# Patient Record
Sex: FEMALE | Race: WHITE | NOT HISPANIC OR LATINO | Employment: FULL TIME | URBAN - METROPOLITAN AREA
[De-identification: names, ages, dates, MRNs, and addresses within clinical notes are randomized per-mention and may not be internally consistent; named-entity substitution may affect disease eponyms.]

---

## 2018-04-25 ENCOUNTER — OFFICE VISIT - HEALTHEAST (OUTPATIENT)
Dept: FAMILY MEDICINE | Facility: CLINIC | Age: 58
End: 2018-04-25

## 2018-04-25 ENCOUNTER — COMMUNICATION - HEALTHEAST (OUTPATIENT)
Dept: SCHEDULING | Facility: CLINIC | Age: 58
End: 2018-04-25

## 2018-04-25 DIAGNOSIS — M54.9 BACK PAIN: ICD-10-CM

## 2018-04-25 DIAGNOSIS — Z12.31 VISIT FOR SCREENING MAMMOGRAM: ICD-10-CM

## 2018-04-25 DIAGNOSIS — Z12.11 SCREEN FOR COLON CANCER: ICD-10-CM

## 2018-04-25 LAB
ALBUMIN UR-MCNC: NEGATIVE MG/DL
APPEARANCE UR: CLEAR
BILIRUB UR QL STRIP: NEGATIVE
COLOR UR AUTO: YELLOW
GLUCOSE UR STRIP-MCNC: NEGATIVE MG/DL
HGB UR QL STRIP: NEGATIVE
KETONES UR STRIP-MCNC: NEGATIVE MG/DL
LEUKOCYTE ESTERASE UR QL STRIP: NEGATIVE
NITRATE UR QL: NEGATIVE
PH UR STRIP: 5.5 [PH] (ref 5–8)
SP GR UR STRIP: 1.02 (ref 1–1.03)
UROBILINOGEN UR STRIP-ACNC: NORMAL

## 2018-04-25 ASSESSMENT — MIFFLIN-ST. JEOR: SCORE: 1159.73

## 2018-04-30 ENCOUNTER — COMMUNICATION - HEALTHEAST (OUTPATIENT)
Dept: FAMILY MEDICINE | Facility: CLINIC | Age: 58
End: 2018-04-30

## 2018-05-01 ENCOUNTER — COMMUNICATION - HEALTHEAST (OUTPATIENT)
Dept: FAMILY MEDICINE | Facility: CLINIC | Age: 58
End: 2018-05-01

## 2018-05-01 ENCOUNTER — COMMUNICATION - HEALTHEAST (OUTPATIENT)
Dept: SCHEDULING | Facility: CLINIC | Age: 58
End: 2018-05-01

## 2018-08-02 ENCOUNTER — COMMUNICATION - HEALTHEAST (OUTPATIENT)
Dept: FAMILY MEDICINE | Facility: CLINIC | Age: 58
End: 2018-08-02

## 2019-04-29 ENCOUNTER — COMMUNICATION - HEALTHEAST (OUTPATIENT)
Dept: FAMILY MEDICINE | Facility: CLINIC | Age: 59
End: 2019-04-29

## 2019-04-29 DIAGNOSIS — Z12.31 VISIT FOR SCREENING MAMMOGRAM: ICD-10-CM

## 2020-10-16 ENCOUNTER — OFFICE VISIT - HEALTHEAST (OUTPATIENT)
Dept: FAMILY MEDICINE | Facility: CLINIC | Age: 60
End: 2020-10-16

## 2020-10-16 DIAGNOSIS — F43.10 PTSD (POST-TRAUMATIC STRESS DISORDER): ICD-10-CM

## 2020-10-16 RX ORDER — FAMOTIDINE 20 MG/1
20 TABLET, FILM COATED ORAL 2 TIMES DAILY
Status: SHIPPED | COMMUNITY
Start: 2020-10-16

## 2020-11-06 ENCOUNTER — COMMUNICATION - HEALTHEAST (OUTPATIENT)
Dept: FAMILY MEDICINE | Facility: CLINIC | Age: 60
End: 2020-11-06

## 2020-11-27 ENCOUNTER — OFFICE VISIT - HEALTHEAST (OUTPATIENT)
Dept: FAMILY MEDICINE | Facility: CLINIC | Age: 60
End: 2020-11-27

## 2020-11-27 DIAGNOSIS — F32.4 MAJOR DEPRESSIVE DISORDER WITH SINGLE EPISODE, IN PARTIAL REMISSION (H): ICD-10-CM

## 2020-11-27 ASSESSMENT — PATIENT HEALTH QUESTIONNAIRE - PHQ9: SUM OF ALL RESPONSES TO PHQ QUESTIONS 1-9: 0

## 2021-01-06 ENCOUNTER — COMMUNICATION - HEALTHEAST (OUTPATIENT)
Dept: FAMILY MEDICINE | Facility: CLINIC | Age: 61
End: 2021-01-06

## 2021-01-06 DIAGNOSIS — F43.10 PTSD (POST-TRAUMATIC STRESS DISORDER): ICD-10-CM

## 2021-05-27 ASSESSMENT — PATIENT HEALTH QUESTIONNAIRE - PHQ9: SUM OF ALL RESPONSES TO PHQ QUESTIONS 1-9: 0

## 2021-06-01 VITALS — HEIGHT: 63 IN | WEIGHT: 140.13 LBS | BODY MASS INDEX: 24.83 KG/M2
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
## 2021-06-12 NOTE — TELEPHONE ENCOUNTER
Please have her stay on the 1/2 tablet daily. We can discuss further later this month    Thank you    BB
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
## 2021-06-14 NOTE — TELEPHONE ENCOUNTER
RN cannot approve Refill Request    RN can NOT refill this medication please review sig. Last office visit: 4/25/2018 Anyi Izaguirre MD Last Physical: Visit date not found Last MTM visit: Visit date not found Last visit same specialty: 4/25/2018 Anyi Izaguirre MD.  Next visit within 3 mo: Visit date not found  Next physical within 3 mo: Visit date not found      Yolanda Olivo, [facility] Connection Triage/Med Refill 1/6/2021    Requested Prescriptions   Pending Prescriptions Disp Refills     sertraline (ZOLOFT) 50 MG tablet [Pharmacy Med Name: SERTRALINE HCL 50 MG TABLET] 85 tablet 0     Sig: TAKE 0.5 TABLETS (25 MG TOTAL) BY MOUTH DAILY FOR 10 DAYS, THEN 1 TABLET (50 MG TOTAL) DAILY.       [facility] Refill Protocol  Passed - 1/6/2021 12:34 AM        Passed - PCP or prescribing provider visit in last year     Last office visit with prescriber/PCP: 4/25/2018 Anyi Izaguirre MD OR same dept: Visit date not found OR same specialty: 4/25/2018 Anyi Izaguirre MD  Last physical: Visit date not found Last MTM visit: Visit date not found   Next visit within 3 mo: Visit date not found  Next physical within 3 mo: Visit date not found  Prescriber OR PCP: Anyi Izaguirre MD  Last diagnosis associated with med order: 1. PTSD (post-traumatic stress disorder)  - sertraline (ZOLOFT) 50 MG tablet [Pharmacy Med Name: SERTRALINE HCL 50 MG TABLET]; Take 0.5 tablets (25 mg total) by mouth daily for 10 days, THEN 1 tablet (50 mg total) daily.  Dispense: 85 tablet; Refill: 0    If protocol passes may refill for 12 months if within 3 months of last provider visit (or a total of 15 months).

## 2021-06-19 NOTE — LETTER
Letter by Anyi Izaguirre MD at      Author: Anyi Izaguirre MD Service: -- Author Type: --    Filed:  Encounter Date: 4/29/2019 Status: (Other)         Fidelina Fry  [address]  [place] [address]             April 29, 2019        Dear Fidelina Fry :    Dr. Izaguirre was reviewing your chart and noticed that you are due for a mammogram. Your last mammogram was 2/15/2014. A referral for a mammogram was placed in your chart on 4/29/19.    To prevent delays in your care, please call [phone] to schedule your screening mammogram.    If you had a mammogram performed within the last 2 years at a different facility please contact the [facility] at [phone]   so we can get that report.      Sincerely,  Your care team at [facility]

## 2021-06-21 NOTE — LETTER
Letter by Anyi Izaguirre MD at      Author: Anyi Izaguirre MD Service: -- Author Type: --    Filed:  Encounter Date: 10/16/2020 Status: (Other)         October 16, 2020     Patient: Fidelina Fry   YOB: 1960   Date of Visit: 10/16/2020       To Whom It May Concern:    It is my medical opinion that it is medically necessary for Fidelina Fry to have her service dog living with her.    Service dog ID:  [id number]  Service dog name: Christi    If you have any questions or concerns, please don't hesitate to call.    Sincerely,        Electronically signed by Anyi Izaguirre MD

## 2022-01-25 ENCOUNTER — LAB (OUTPATIENT)
Dept: FAMILY MEDICINE | Facility: CLINIC | Age: 62
End: 2022-01-25
Payer: COMMERCIAL

## 2022-01-25 DIAGNOSIS — Z20.822 SUSPECTED COVID-19 VIRUS INFECTION: ICD-10-CM

## 2022-01-25 PROCEDURE — U0005 INFEC AGEN DETEC AMPLI PROBE: HCPCS

## 2022-01-25 PROCEDURE — U0003 INFECTIOUS AGENT DETECTION BY NUCLEIC ACID (DNA OR RNA); SEVERE ACUTE RESPIRATORY SYNDROME CORONAVIRUS 2 (SARS-COV-2) (CORONAVIRUS DISEASE [COVID-19]), AMPLIFIED PROBE TECHNIQUE, MAKING USE OF HIGH THROUGHPUT TECHNOLOGIES AS DESCRIBED BY CMS-2020-01-R: HCPCS

## 2022-01-26 ENCOUNTER — TELEPHONE (OUTPATIENT)
Dept: FAMILY MEDICINE | Facility: CLINIC | Age: 62
End: 2022-01-26
Payer: COMMERCIAL

## 2022-01-26 LAB — SARS-COV-2 RNA RESP QL NAA+PROBE: POSITIVE

## 2022-02-01 ENCOUNTER — TELEPHONE (OUTPATIENT)
Dept: FAMILY MEDICINE | Facility: CLINIC | Age: 62
End: 2022-02-01
Payer: COMMERCIAL

## 2022-02-01 NOTE — TELEPHONE ENCOUNTER
Reason for Call:  Vitamins & Covid     Detailed comments: Pt was tested Positive 1/25/22  Wondering what Vitamins she can take for this. ( D3 & K2, Quercetin complex with extra C plus, Omega 3 with Tumeric,  Zinc, NAC Powder      Phone Number Patient can be reached at: Home number on file [phone] (home)    Best Time: Any Time      Can we leave a detailed message on this number? YES    Call taken on 2/1/2022 at 7:08 AM by Tiffany Tinoco

## 2022-02-01 NOTE — TELEPHONE ENCOUNTER
Nothing specific for vitamins - stay well hydrated.    She can go to the [place] dept of health website to investigate monoclonal antibodies as well     EB

## 2022-02-01 NOTE — TELEPHONE ENCOUNTER
Call placed to patient.  Relayed message per Dr Izaguirre.  Patient state she does not have a computer.  She is not interested in monoclonal antibody infusion.  Reviewed home care recommendations and red flag symptoms that would need evaluation.  Patient verbalized understanding.  Ashlee Bautista RN

## 2023-09-01 ENCOUNTER — OFFICE VISIT (OUTPATIENT)
Dept: FAMILY MEDICINE | Facility: CLINIC | Age: 63
End: 2023-09-01
Payer: COMMERCIAL

## 2023-09-01 VITALS
TEMPERATURE: 98.1 F | BODY MASS INDEX: 27.46 KG/M2 | OXYGEN SATURATION: 98 % | RESPIRATION RATE: 16 BRPM | WEIGHT: 149.2 LBS | HEIGHT: 62 IN | SYSTOLIC BLOOD PRESSURE: 118 MMHG | DIASTOLIC BLOOD PRESSURE: 78 MMHG | HEART RATE: 60 BPM

## 2023-09-01 DIAGNOSIS — F32.4 MAJOR DEPRESSIVE DISORDER WITH SINGLE EPISODE, IN PARTIAL REMISSION (H): ICD-10-CM

## 2023-09-01 DIAGNOSIS — Z12.11 SCREEN FOR COLON CANCER: ICD-10-CM

## 2023-09-01 DIAGNOSIS — Z12.31 VISIT FOR SCREENING MAMMOGRAM: ICD-10-CM

## 2023-09-01 DIAGNOSIS — Z00.00 HEALTHCARE MAINTENANCE: ICD-10-CM

## 2023-09-01 DIAGNOSIS — Z00.00 ENCOUNTER FOR MEDICARE ANNUAL WELLNESS EXAM: Primary | ICD-10-CM

## 2023-09-01 LAB
ALBUMIN SERPL BCG-MCNC: 4.4 G/DL (ref 3.5–5.2)
ALP SERPL-CCNC: 105 U/L (ref 35–104)
ALT SERPL W P-5'-P-CCNC: 17 U/L (ref 0–50)
ANION GAP SERPL CALCULATED.3IONS-SCNC: 9 MMOL/L (ref 7–15)
AST SERPL W P-5'-P-CCNC: 22 U/L (ref 0–45)
BILIRUB SERPL-MCNC: 0.3 MG/DL
BUN SERPL-MCNC: 16.4 MG/DL (ref 8–23)
CALCIUM SERPL-MCNC: 9.9 MG/DL (ref 8.8–10.2)
CHLORIDE SERPL-SCNC: 105 MMOL/L (ref 98–107)
CHOLEST SERPL-MCNC: 193 MG/DL
CREAT SERPL-MCNC: 0.93 MG/DL (ref 0.51–0.95)
DEPRECATED HCO3 PLAS-SCNC: 29 MMOL/L (ref 22–29)
ERYTHROCYTE [DISTWIDTH] IN BLOOD BY AUTOMATED COUNT: 12.9 % (ref 10–15)
GFR SERPL CREATININE-BSD FRML MDRD: 68 ML/MIN/1.73M2
GLUCOSE SERPL-MCNC: 99 MG/DL (ref 70–99)
HBA1C MFR BLD: 5.6 % (ref 0–5.6)
HCT VFR BLD AUTO: 40.3 % (ref 35–47)
HDLC SERPL-MCNC: 70 MG/DL
HGB BLD-MCNC: 13.3 G/DL (ref 11.7–15.7)
LDLC SERPL CALC-MCNC: 106 MG/DL
MCH RBC QN AUTO: 29.8 PG (ref 26.5–33)
MCHC RBC AUTO-ENTMCNC: 33 G/DL (ref 31.5–36.5)
MCV RBC AUTO: 90 FL (ref 78–100)
NONHDLC SERPL-MCNC: 123 MG/DL
PLATELET # BLD AUTO: 283 10E3/UL (ref 150–450)
POTASSIUM SERPL-SCNC: 4.8 MMOL/L (ref 3.4–5.3)
PROT SERPL-MCNC: 7.2 G/DL (ref 6.4–8.3)
RBC # BLD AUTO: 4.47 10E6/UL (ref 3.8–5.2)
SODIUM SERPL-SCNC: 143 MMOL/L (ref 136–145)
TRIGL SERPL-MCNC: 85 MG/DL
TSH SERPL DL<=0.005 MIU/L-ACNC: 1.86 UIU/ML (ref 0.3–4.2)
WBC # BLD AUTO: 6.7 10E3/UL (ref 4–11)

## 2023-09-01 PROCEDURE — 83036 HEMOGLOBIN GLYCOSYLATED A1C: CPT | Performed by: FAMILY MEDICINE

## 2023-09-01 PROCEDURE — 84443 ASSAY THYROID STIM HORMONE: CPT | Performed by: FAMILY MEDICINE

## 2023-09-01 PROCEDURE — 36415 COLL VENOUS BLD VENIPUNCTURE: CPT | Performed by: FAMILY MEDICINE

## 2023-09-01 PROCEDURE — 80053 COMPREHEN METABOLIC PANEL: CPT | Performed by: FAMILY MEDICINE

## 2023-09-01 PROCEDURE — G0402 INITIAL PREVENTIVE EXAM: HCPCS | Performed by: FAMILY MEDICINE

## 2023-09-01 PROCEDURE — 85027 COMPLETE CBC AUTOMATED: CPT | Performed by: FAMILY MEDICINE

## 2023-09-01 PROCEDURE — 80061 LIPID PANEL: CPT | Performed by: FAMILY MEDICINE

## 2023-09-01 ASSESSMENT — PATIENT HEALTH QUESTIONNAIRE - PHQ9
SUM OF ALL RESPONSES TO PHQ QUESTIONS 1-9: 0
SUM OF ALL RESPONSES TO PHQ QUESTIONS 1-9: 0
10. IF YOU CHECKED OFF ANY PROBLEMS, HOW DIFFICULT HAVE THESE PROBLEMS MADE IT FOR YOU TO DO YOUR WORK, TAKE CARE OF THINGS AT HOME, OR GET ALONG WITH OTHER PEOPLE: NOT DIFFICULT AT ALL

## 2023-09-01 ASSESSMENT — ENCOUNTER SYMPTOMS
WEAKNESS: 0
PALPITATIONS: 0
CONSTIPATION: 0
BREAST MASS: 0
DIARRHEA: 0
PARESTHESIAS: 0
NAUSEA: 0
DYSURIA: 0
DIZZINESS: 0
FEVER: 0
EYE PAIN: 0
NERVOUS/ANXIOUS: 0
SORE THROAT: 0
COUGH: 1
HEMATOCHEZIA: 0
CHILLS: 0
ABDOMINAL PAIN: 0
MYALGIAS: 0
JOINT SWELLING: 0
HEMATURIA: 0
HEARTBURN: 1
HEADACHES: 0
FREQUENCY: 0
ARTHRALGIAS: 0

## 2023-09-01 ASSESSMENT — ACTIVITIES OF DAILY LIVING (ADL): CURRENT_FUNCTION: NO ASSISTANCE NEEDED

## 2023-09-01 NOTE — LETTER
September 5, 2023      Fidelina Fry  [address]    [place] [address]        Dear ,    We are writing to inform you of your test results.    It was great to see you!  Kidneys, electrolytes, blood sugar, thyroid, blood counts and liver function are normal.  Screening test for diabetes is normal.      Based on your age, race, smoking status,blood-pressure, and cholesterol levels your calculated 10 year risk for a vascular event (heart attack/stroke) is 4.2%.   Generally we will recommend starting a cholesterol medication once that risk is about 7.5%.     The 10-year ASCVD risk score (Svetlana BRUCE, et al., 2019) is: 4.2%     Values used to calculate the score:       Age: 63 years       Sex: Female       Is Non- : No       Diabetic: No       Tobacco smoker: No       Systolic Blood Pressure: 118 mmHg       Is BP treated: No       HDL Cholesterol: 70 mg/dL       Total Cholesterol: 193 mg/dL        Please let me know if you have any questions!     Resulted Orders   Lipid panel reflex to direct LDL Non-fasting   Result Value Ref Range    Cholesterol 193 <200 mg/dL    Triglycerides 85 <150 mg/dL    Direct Measure HDL 70 >=50 mg/dL    LDL Cholesterol Calculated 106 (H) <=100 mg/dL    Non HDL Cholesterol 123 <130 mg/dL    Narrative    Cholesterol  Desirable:  <200 mg/dL    Triglycerides  Normal:  Less than 150 mg/dL  Borderline High:  150-199 mg/dL  High:  200-499 mg/dL  Very High:  Greater than or equal to 500 mg/dL    Direct Measure HDL  Female:  Greater than or equal to 50 mg/dL   Male:  Greater than or equal to 40 mg/dL    LDL Cholesterol  Desirable:  <100mg/dL  Above Desirable:  100-129 mg/dL   Borderline High:  130-159 mg/dL   High:  160-189 mg/dL   Very High:  >= 190 mg/dL    Non HDL Cholesterol  Desirable:  130 mg/dL  Above Desirable:  130-159 mg/dL  Borderline High:  160-189 mg/dL  High:  190-219 mg/dL  Very High:  Greater than or equal to 220 mg/dL   TSH with free  T4 reflex   Result Value Ref Range    TSH 1.86 0.30 - 4.20 uIU/mL   CBC with Platelets   Result Value Ref Range    WBC Count 6.7 4.0 - 11.0 10e3/uL    RBC Count 4.47 3.80 - 5.20 10e6/uL    Hemoglobin 13.3 11.7 - 15.7 g/dL    Hematocrit 40.3 35.0 - 47.0 %    MCV 90 78 - 100 fL    MCH 29.8 26.5 - 33.0 pg    MCHC 33.0 31.5 - 36.5 g/dL    RDW 12.9 10.0 - 15.0 %    Platelet Count 283 150 - 450 10e3/uL   COMPREHENSIVE METABOLIC PANEL   Result Value Ref Range    Sodium 143 136 - 145 mmol/L    Potassium 4.8 3.4 - 5.3 mmol/L    Chloride 105 98 - 107 mmol/L    Carbon Dioxide (CO2) 29 22 - 29 mmol/L    Anion Gap 9 7 - 15 mmol/L    Urea Nitrogen 16.4 8.0 - 23.0 mg/dL    Creatinine 0.93 0.51 - 0.95 mg/dL    Calcium 9.9 8.8 - 10.2 mg/dL    Glucose 99 70 - 99 mg/dL    Alkaline Phosphatase 105 (H) 35 - 104 U/L    AST 22 0 - 45 U/L      Comment:      Reference intervals for this test were updated on 6/12/2023 to more accurately reflect our healthy population. There may be differences in the flagging of prior results with similar values performed with this method. Interpretation of those prior results can be made in the context of the updated reference intervals.    ALT 17 0 - 50 U/L      Comment:      Reference intervals for this test were updated on 6/12/2023 to more accurately reflect our healthy population. There may be differences in the flagging of prior results with similar values performed with this method. Interpretation of those prior results can be made in the context of the updated reference intervals.      Protein Total 7.2 6.4 - 8.3 g/dL    Albumin 4.4 3.5 - 5.2 g/dL    Bilirubin Total 0.3 <=1.2 mg/dL    GFR Estimate 68 >60 mL/min/1.73m2   HEMOGLOBIN A1C   Result Value Ref Range    Hemoglobin A1C 5.6 0.0 - 5.6 %      Comment:      Normal <5.7%   Prediabetes 5.7-6.4%    Diabetes 6.5% or higher     Note: Adopted from ADA consensus guidelines.       If you have any questions or concerns, please call the clinic at the  number listed above.       Sincerely,      Anyi Izaguirre MD/  [facility]

## 2023-09-01 NOTE — PATIENT INSTRUCTIONS
Patient Education   Personalized Prevention Plan  You are due for the preventive services outlined below.  Your care team is available to assist you in scheduling these services.  If you have already completed any of these items, please share that information with your care team to update in your medical record.  Health Maintenance Due   Topic Date Due     Osteoporosis Screening  Never done     ANNUAL REVIEW OF HM ORDERS  Never done     Discuss Advance Care Planning  Never done     Depression Action Plan  Never done     COVID-19 Vaccine (1) Never done     Colorectal Cancer Screening  Never done     HIV Screening  Never done     Hepatitis C Screening  Never done     Zoster (Shingles) Vaccine (1 of 2) Never done     LUNG CANCER SCREENING  Never done     Mammogram  02/15/2016     Cholesterol Lab  02/08/2021     Depression Assessment  05/27/2021     Annual Wellness Visit  04/10/2023     FALL RISK ASSESSMENT  Never done     Flu Vaccine (1) Never done     Pneumococcal Vaccine (1 - PCV) 04/10/2023

## 2023-09-01 NOTE — PROGRESS NOTES
"SUBJECTIVE:   Fidelina is a 63 year old who presents for Preventive Visit.      9/1/2023     2:50 PM   Additional Questions   Roomed by LAUREN Olivia   Accompanied by self       Are you in the first 12 months of your Medicare coverage?  Yes,  Visual Acuity:  Right Eye: 20/20   Left Eye: 20/16  Both Eyes: 20/16    Healthy Habits:     In general, how would you rate your overall health?  Good    Frequency of exercise:  6-7 days/week    Duration of exercise:  Other    Do you usually eat at least 4 servings of fruit and vegetables a day, include whole grains    & fiber and avoid regularly eating high fat or \"junk\" foods?  No    Taking medications regularly:  Yes    Medication side effects:  Not applicable    Ability to successfully perform activities of daily living:  No assistance needed    Home Safety:  Throw rugs in the hallway and lack of grab bars in the bathroom    Hearing Impairment:  No hearing concerns    In the past 6 months, have you been bothered by leaking of urine? Yes    In general, how would you rate your overall mental or emotional health?  Good    Additional concerns today:  No    Has family hx of diabetes, would like to be screened for this.     Have you ever done Advance Care Planning? (For example, a Health Directive, POLST, or a discussion with a medical provider or your loved ones about your wishes): Yes, patient states has an Advance Care Planning document and will bring a copy to the clinic.       Fall risk  Fallen 2 or more times in the past year?: No  Any fall with injury in the past year?: No  click delete button to remove this line now  Cognitive Screening   1) Repeat 3 items (Leader, Season, Table)    2) Clock draw: NORMAL  3) 3 item recall: Recalls 1 object   Results: NORMAL clock, 1-2 items recalled: COGNITIVE IMPAIRMENT LESS LIKELY    Mini-CogTM Copyright S Matthieu. Licensed by the author for use in [facility]; reprinted with permission (stephani@.[facility]). All rights reserved.  "     Do you have sleep apnea, excessive snoring or daytime drowsiness? : no    Reviewed and updated as needed this visit by clinical staff                  Reviewed and updated as needed this visit by Provider                 Social History     Tobacco Use    Smoking status: Former     Types: Cigarettes     Quit date: 3/16/2018     Years since quittin.4    Smokeless tobacco: Never   Substance Use Topics    Alcohol use: No              No data to display                   No data to display              Do you have a current opioid prescription? No  Do you use any other controlled substances or medications that are not prescribed by a provider? None              Current providers sharing in care for this patient include:   Patient Care Team:  Anyi Izaguirre MD as PCP - General (Family Practice)  Anyi Izaguirre MD as Assigned PCP    The following health maintenance items are reviewed in Epic and correct as of today:  Health Maintenance   Topic Date Due    DEXA  Never done    ANNUAL REVIEW OF HM ORDERS  Never done    ADVANCE CARE PLANNING  Never done    DEPRESSION ACTION PLAN  Never done    COVID-19 Vaccine (1) Never done    COLORECTAL CANCER SCREENING  Never done    HIV SCREENING  Never done    HEPATITIS C SCREENING  Never done    ZOSTER IMMUNIZATION (1 of 2) Never done    LUNG CANCER SCREENING  Never done    MAMMO SCREENING  02/15/2016    LIPID  2021    PHQ-9  2021    MEDICARE ANNUAL WELLNESS VISIT  04/10/2023    FALL RISK ASSESSMENT  Never done    INFLUENZA VACCINE (1) Never done    Pneumococcal Vaccine: 65+ Years (1 - PCV) 04/10/2023    DTAP/TDAP/TD IMMUNIZATION (2 - Td or Tdap) 2024    IPV IMMUNIZATION  Aged Out    HPV IMMUNIZATION  Aged Out    MENINGITIS IMMUNIZATION  Aged Out    PAP  Discontinued     Lab work is in process  {    Mammogram Screening: Mammogram Screening: Recommended mammography every 1-2 years with patient discussion and risk factor consideration  Any new  "diagnosis of family breast, ovarian, or bowel cancer? No    FHS-7:        No data to display                Mammogram Screening: Recommended mammography every 1-2 years with patient discussion and risk factor consideration  Pertinent mammograms are reviewed under the imaging tab.    Review of Systems   Constitutional:  Negative for chills and fever.   HENT:  Negative for congestion, ear pain, hearing loss and sore throat.    Eyes:  Negative for pain and visual disturbance.   Respiratory:  Positive for cough.    Cardiovascular:  Positive for peripheral edema. Negative for chest pain and palpitations.   Gastrointestinal:  Positive for heartburn. Negative for abdominal pain, constipation, diarrhea, hematochezia and nausea.   Breasts:  Negative for tenderness, breast mass and discharge.   Genitourinary:  Negative for dysuria, frequency, genital sores, hematuria, pelvic pain, urgency, vaginal bleeding and vaginal discharge.   Musculoskeletal:  Negative for arthralgias, joint swelling and myalgias.   Skin:  Negative for rash.   Neurological:  Negative for dizziness, weakness, headaches and paresthesias.   Psychiatric/Behavioral:  Negative for mood changes. The patient is not nervous/anxious.          OBJECTIVE:   There were no vitals taken for this visit. Estimated body mass index is 24.82 kg/m  as calculated from the following:    Height as of 4/25/18: 1.6 m (5' 3\").    Weight as of 4/25/18: 63.6 kg (140 lb 2 oz).  Physical Exam    Physical Exam:  General Appearance: Alert, cooperative, no distress, appears stated age   Head: Normocephalic, without obvious abnormality, atraumatic  Eyes: PERRL, conjunctiva/corneas clear, EOM's intact   Ears: Normal TM's and external ear canals, both ears  Nose:Nares normal, septum midline,mucosa normal, no drainage    Throat:Lips, mucosa, and tongue normal; teeth and gums normal  Neck: Supple, symmetrical, trachea midline, no adenopathy;  thyroid: not enlarged, symmetric, no " tenderness/mass/nodules  Back: Symmetric, no curvature, ROM normal,  Lungs: Clear to auscultation bilaterally, respirations unlabored  Breasts: No breast masses, tenderness, asymmetry, or nipple discharge.  Heart: Regular rate and rhythm, S1 and S2 normal, no murmur, rub, or gallop  Abdomen: Soft, non-tender, bowel sounds active all four quadrants,  no masses, no organomegaly  Extremities: Extremities normal, atraumatic, no cyanosis or edema  Skin: Skin color, texture, turgor normal, no rashes or lesions  Lymph nodes: Cervical, supraclavicular, and axillary nodes normal and   Neurologic: Normal          ASSESSMENT / PLAN:   1. Encounter for Medicare annual wellness exam  Patient I discussed immunizations today.  She declines pneumonia, COVID and shingles vaccines.  She declines colon cancer screening.  She declined bone density scan.  - PRIMARY CARE FOLLOW-UP SCHEDULING; Future  - REVIEW OF HEALTH MAINTENANCE PROTOCOL ORDERS    2. Screen for colon cancer  Patient declines colon cancer screening both colonoscopy as well as Cologuard    3. Visit for screening mammogram  - MA SCREENING DIGITAL BILAT - Future  (s+30); Future    4. Healthcare maintenance  Lengthy discussion with patient regarding weight management.  Encouraged her to begin to keep track of calorie intake.  Encouraged her to consider the [employer] adonay.  - Lipid panel reflex to direct LDL Non-fasting; Future  - TSH with free T4 reflex; Future  - CBC with Platelets; Future  - COMPREHENSIVE METABOLIC PANEL; Future  - HEMOGLOBIN A1C; Future  - Lipid panel reflex to direct LDL Non-fasting  - TSH with free T4 reflex  - CBC with Platelets  - COMPREHENSIVE METABOLIC PANEL  - HEMOGLOBIN A1C    5. Major depressive disorder with single episode, in partial remission (H)  Stable at this time.      Patient has been advised of split billing requirements and indicates understanding: Yes      COUNSELING:  Reviewed preventive health counseling, as reflected in patient  instructions        She reports that she quit smoking about 5 years ago. Her smoking use included cigarettes. She has never used smokeless tobacco.      Appropriate preventive services were discussed with this patient, including applicable screening as appropriate for cardiovascular disease, diabetes, osteopenia/osteoporosis, and glaucoma.  As appropriate for age/gender, discussed screening for colorectal cancer, prostate cancer, breast cancer, and cervical cancer. Checklist reviewing preventive services available has been given to the patient.    Reviewed patients plan of care and provided an AVS. The Basic Care Plan (routine screening as documented in Health Maintenance) for Fidelina meets the Care Plan requirement. This Care Plan has been established and reviewed with the Patient.          Anyi Izaguirre MD  [facility]    Identified Health Risks:  I have reviewed Opioid Use Disorder and Substance Use Disorder risk factors and made any needed referrals.

## 2023-09-04 PROBLEM — F32.4 MAJOR DEPRESSIVE DISORDER WITH SINGLE EPISODE, IN PARTIAL REMISSION (H): Status: ACTIVE | Noted: 2023-09-04

## 2023-09-12 ENCOUNTER — DOCUMENTATION ONLY (OUTPATIENT)
Dept: OTHER | Facility: CLINIC | Age: 63
End: 2023-09-12
Payer: COMMERCIAL

## 2023-10-14 ENCOUNTER — ANCILLARY PROCEDURE (OUTPATIENT)
Dept: MAMMOGRAPHY | Facility: CLINIC | Age: 63
End: 2023-10-14
Attending: FAMILY MEDICINE
Payer: COMMERCIAL

## 2023-10-14 DIAGNOSIS — Z12.31 VISIT FOR SCREENING MAMMOGRAM: ICD-10-CM

## 2023-10-14 PROCEDURE — 77067 SCR MAMMO BI INCL CAD: CPT

## 2023-12-20 ENCOUNTER — NURSE TRIAGE (OUTPATIENT)
Dept: NURSING | Facility: CLINIC | Age: 63
End: 2023-12-20
Payer: COMMERCIAL

## 2023-12-20 NOTE — TELEPHONE ENCOUNTER
Nurse Triage SBAR    Is this a 2nd Level Triage? NO    Situation:   Pt is calling to ask for information on covid precautions.    Background:   She tested positive today.  Symptoms started yesterday.    Assessment:   She has congestion and headache.  No fevers.  No breathing difficulties.    Recommendation:   Informed patient of precaution information.  Also informed patient that if she is looking for treatment, to call the clinic back.  She is also to call for worsening symptoms.  Pt verbalized understanding.    Roma Celaya RN, BSN Nurse Triage Advisor 12/20/2023 5:55 AM      Reason for Disposition   Health Information question, no triage required and triager able to answer question    Additional Information   Negative: RN needs further essential information from caller in order to complete triage   Negative: Requesting regular office appointment   Negative: [1] Caller requesting NON-URGENT health information AND [2] PCP's office is the best resource    Protocols used: Information Only Call - No Triage[facility]
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
## 2024-07-05 ENCOUNTER — OFFICE VISIT (OUTPATIENT)
Dept: FAMILY MEDICINE | Facility: CLINIC | Age: 64
End: 2024-07-05
Payer: COMMERCIAL

## 2024-07-05 VITALS
OXYGEN SATURATION: 98 % | WEIGHT: 152 LBS | HEIGHT: 62 IN | BODY MASS INDEX: 27.97 KG/M2 | SYSTOLIC BLOOD PRESSURE: 120 MMHG | TEMPERATURE: 98 F | DIASTOLIC BLOOD PRESSURE: 72 MMHG | HEART RATE: 57 BPM | RESPIRATION RATE: 16 BRPM

## 2024-07-05 DIAGNOSIS — B37.9 CANDIDA INFECTION: Primary | ICD-10-CM

## 2024-07-05 PROCEDURE — G2211 COMPLEX E/M VISIT ADD ON: HCPCS | Performed by: NURSE PRACTITIONER

## 2024-07-05 PROCEDURE — 99213 OFFICE O/P EST LOW 20 MIN: CPT | Performed by: NURSE PRACTITIONER

## 2024-07-05 RX ORDER — PRENATAL VIT 91/IRON/FOLIC/DHA 28-975-200
COMBINATION PACKAGE (EA) ORAL 2 TIMES DAILY
Qty: 42 G | Refills: 1 | Status: SHIPPED | OUTPATIENT
Start: 2024-07-05 | End: 2024-07-24

## 2024-07-05 RX ORDER — RESPIRATORY SYNCYTIAL VIRUS VACCINE 120MCG/0.5
0.5 KIT INTRAMUSCULAR ONCE
Qty: 1 EACH | Refills: 0 | Status: CANCELLED | OUTPATIENT
Start: 2024-07-05 | End: 2024-07-05

## 2024-07-05 ASSESSMENT — PATIENT HEALTH QUESTIONNAIRE - PHQ9
10. IF YOU CHECKED OFF ANY PROBLEMS, HOW DIFFICULT HAVE THESE PROBLEMS MADE IT FOR YOU TO DO YOUR WORK, TAKE CARE OF THINGS AT HOME, OR GET ALONG WITH OTHER PEOPLE: NOT DIFFICULT AT ALL
SUM OF ALL RESPONSES TO PHQ QUESTIONS 1-9: 0
SUM OF ALL RESPONSES TO PHQ QUESTIONS 1-9: 0

## 2024-07-05 ASSESSMENT — PAIN SCALES - GENERAL: PAINLEVEL: MODERATE PAIN (4)

## 2024-07-05 NOTE — PROGRESS NOTES
"  Assessment & Plan     Candida infection  Would recommend that she start by using a cream if no improvement with symptoms then consider fluconazole if no improvement with the cream.  If she does not have any improvement she could call the clinic and talk to her nurse and they can send me a message and I would consider sending prescriptions for Diflucan for once weekly for 2-4 weeks.  I think likely the symptoms will improve with the cream alone.    - terbinafine (LAMISIL) 1 % external cream; Apply topically 2 times daily    Health maintenance: Patient has declined colonoscopy, vaccines today.  I recommend that she follow-up with her primary care doctor for an annual physical to discuss risk versus benefits of preventative medicine.      BMI  Estimated body mass index is 27.76 kg/m  as calculated from the following:    Height as of this encounter: 1.576 m (5' 2.05\").    Weight as of this encounter: 68.9 kg (152 lb).         FUTURE APPOINTMENTS:       - Follow-up for annual visit or as needed    Subjective   Fidelina is a 64 year old, presenting for the following health issues:  Derm Problem    History of Present Illness       Reason for visit:  Rash in lower female area and under the breasts  Symptom onset:  3-4 weeks ago  Symptom intensity:  Moderate  Symptom progression:  Staying the same  Had these symptoms before:  No  What makes it worse:  No  What makes it better:  No    She eats 0-1 servings of fruits and vegetables daily.She consumes 2 sweetened beverage(s) daily.She exercises with enough effort to increase her heart rate 60 or more minutes per day.  She exercises with enough effort to increase her heart rate 7 days per week.            Breast rash and groin rash has been ongoing for several weeks.  Started with her rash under her breasts and now she states she also has it in her groin, LUPIS area, labia up into the buttock crack.  She has been using cornstarch to keep it dry.  She states she does have urinary " "leakage.  She has not done pelvic floor therapy was not interested in it when I mentioned it as an option.  She uses a type of pad that she can get from her insurance that is inexpensive to her and it works well for catching any leakage.  She denies any vaginal symptoms.    She states that she does not come in to see the doctor very often.  She is not interested in colonoscopy she would prefer not to know ahead of time if she has cancer.  She does not currently have any blood in her stool.  She also declines vaccines.  And all the other healthcare maintenance activities.      Review of Systems  Constitutional, HEENT, cardiovascular, pulmonary, gi and gu systems are negative, except as otherwise noted.      Objective    /72 (BP Location: Right arm, Patient Position: Sitting, Cuff Size: Adult Large)   Pulse 57   Temp 98  F (36.7  C) (Tympanic)   Resp 16   Ht 1.576 m (5' 2.05\")   Wt 68.9 kg (152 lb)   SpO2 98%   BMI 27.76 kg/m    Body mass index is 27.76 kg/m .  Physical Exam  Constitutional:       Appearance: Normal appearance.   HENT:      Head: Normocephalic.   Pulmonary:      Effort: Pulmonary effort is normal. No respiratory distress.   Skin:     General: Skin is warm and dry.      Findings: Rash present.          Neurological:      Mental Status: She is alert and oriented to person, place, and time.   Psychiatric:         Mood and Affect: Mood normal.         Behavior: Behavior normal.         Thought Content: Thought content normal.         Judgment: Judgment normal.                    Signed Electronically by: GRACIELA Spangler CNP    "

## 2024-07-24 ENCOUNTER — OFFICE VISIT (OUTPATIENT)
Dept: FAMILY MEDICINE | Facility: CLINIC | Age: 64
End: 2024-07-24
Payer: COMMERCIAL

## 2024-07-24 VITALS
TEMPERATURE: 98.1 F | BODY MASS INDEX: 27.81 KG/M2 | HEIGHT: 62 IN | SYSTOLIC BLOOD PRESSURE: 100 MMHG | DIASTOLIC BLOOD PRESSURE: 68 MMHG | WEIGHT: 151.13 LBS | RESPIRATION RATE: 12 BRPM | HEART RATE: 67 BPM | OXYGEN SATURATION: 99 %

## 2024-07-24 DIAGNOSIS — B35.4 TINEA CORPORIS: Primary | ICD-10-CM

## 2024-07-24 DIAGNOSIS — N89.9 SWELLING OF VAGINA: ICD-10-CM

## 2024-07-24 DIAGNOSIS — F32.4 MAJOR DEPRESSIVE DISORDER WITH SINGLE EPISODE, IN PARTIAL REMISSION (H): ICD-10-CM

## 2024-07-24 LAB
CLUE CELLS: ABNORMAL
TRICHOMONAS, WET PREP: ABNORMAL
WBC'S/HIGH POWER FIELD, WET PREP: ABNORMAL
YEAST, WET PREP: ABNORMAL

## 2024-07-24 PROCEDURE — 87210 SMEAR WET MOUNT SALINE/INK: CPT | Performed by: FAMILY MEDICINE

## 2024-07-24 PROCEDURE — 99214 OFFICE O/P EST MOD 30 MIN: CPT | Performed by: FAMILY MEDICINE

## 2024-07-24 RX ORDER — ITRACONAZOLE 100 MG/1
200 CAPSULE ORAL DAILY
Qty: 14 CAPSULE | Refills: 0 | Status: SHIPPED | OUTPATIENT
Start: 2024-07-24 | End: 2024-08-05

## 2024-07-24 RX ORDER — PREDNISONE 20 MG/1
20 TABLET ORAL 2 TIMES DAILY
Qty: 10 TABLET | Refills: 0 | Status: SHIPPED | OUTPATIENT
Start: 2024-07-24 | End: 2024-08-05

## 2024-07-24 NOTE — LETTER
July 26, 2024      Fidelina DELGADILLO Reese  [address]    [place] [address]        Dear ,    We are writing to inform you of your test results.    It was great to see you!  Your swab showed some irritation but no vaginal infection.  I hope you are feeling improved!     Resulted Orders   Wet prep - Clinic Collect   Result Value Ref Range    Trichomonas Absent Absent    Yeast Absent Absent    Clue Cells Absent Absent    WBCs/high power field 3+ (A) None       If you have any questions or concerns, please call the clinic at the number listed above.       Sincerely,      Anyi Izaguirre MD/  [facility] Care Team

## 2024-07-25 ENCOUNTER — TELEPHONE (OUTPATIENT)
Dept: FAMILY MEDICINE | Facility: CLINIC | Age: 64
End: 2024-07-25
Payer: COMMERCIAL

## 2024-07-25 NOTE — TELEPHONE ENCOUNTER
Prior Authorization Retail Medication Request    Medication/Dose: Itraconazole  Diagnosis and ICD code (if different than what is on RX):    Tinea corporis [B35.4]         New/renewal/insurance change PA/secondary ins. PA:  Previously Tried and Failed:    Rationale:      Insurance   Primary: [employer]  Insurance ID:  [id number]    Pharmacy Information (if different than what is on RX)  Name:  [facility] [place]  Phone:  [phone]  Fax:[phone]

## 2024-07-25 NOTE — PROGRESS NOTES
Assessment/Plan:    Fidelina Fry is a 64 year old female presenting for:    Tinea corporis  I do believe the patient still has a bit of a fungal infection and itraconazole was sent to the pharmacy.  At 200 mg daily for 7 days.  I also knew she may be had allergic reaction to either the Goldbond or the Lotrimin cream.  Encouraged her to only use Vaseline or Aquaphor on the area, sitz bath's and prednisone sent to the pharmacy for 5 days.  Discussed risk and benefits.  - predniSONE (DELTASONE) 20 MG tablet  Dispense: 10 tablet; Refill: 0  - itraconazole (SPORANOX) 100 MG capsule  Dispense: 14 capsule; Refill: 0    Swelling of vagina  Wet prep did not show any yeast or bacterial vaginosis.  - Wet prep - Clinic Collect    Major depressive disorder with single episode, in partial remission (H24)  Patient is stable.  No medication needed at this time      Medications Discontinued During This Encounter   Medication Reason    fluconazole (DIFLUCAN) 200 MG tablet Therapy completed (No AVS)    terbinafine (LAMISIL) 1 % external cream Therapy completed (No AVS)           Chief Complaint:  Follow Up        Subjective:   Fidelina Fry is a very pleasant 64-year-old female who presents to the clinic today with concerns over a rash in various locations of her body as well as some vaginal swelling and itching.    Patient notes that she initially began getting a rash in her groin about 2 and half weeks ago.  She put some Goldbond powder in the area which seem to irritate it worse.  She was seen here in clinic with a groin rash as well as rash under her breasts.  At that time she was treated with antifungal cream.  She states that this was not helpful and she reach back out to the clinic 2 days later.  She was given Diflucan and she took 1 tablet right away and then another tablet a week later.  She thinks that this was may be mildly helpful.  She is not noticing the rash under her armpits as well.    She does not notice  "any vaginal discharge or odor but notes that the vaginal area is swollen and very itchy.    12 point review of systems completed and negative except for what has been described above    History   Smoking Status    Former    Types: Cigarettes    Quit date: 3/16/2018   Smokeless Tobacco    Never         Current Outpatient Medications:     famotidine (PEPCID) 20 MG tablet, [FAMOTIDINE (PEPCID) 20 MG TABLET] Take 20 mg by mouth 2 (two) times a day., Disp: , Rfl:     IBUPROFEN (ADVIL ORAL), , Disp: , Rfl:     itraconazole (SPORANOX) 100 MG capsule, Take 2 capsules (200 mg) by mouth daily, Disp: 14 capsule, Rfl: 0    predniSONE (DELTASONE) 20 MG tablet, Take 1 tablet (20 mg) by mouth 2 times daily, Disp: 10 tablet, Rfl: 0      Objective:  Vitals:    07/24/24 1323   BP: 100/68   Pulse: 67   Resp: 12   Temp: 98.1  F (36.7  C)   TempSrc: Tympanic   SpO2: 99%   Weight: 68.5 kg (151 lb 2 oz)   Height: 1.576 m (5' 2.05\")       Body mass index is 27.6 kg/m .    Vital signs reviewed and stable  General: No acute distress  Psych: Appropriate affect  HEENT: moist mucous membranes,  Abdomen: soft, non tender, non distended with normo-active bowel sounds  Extremities: warm and well perfused with no edema  Skin: warm and dry with erythema under abdominal pannus and into bilateral groin.  This is fairly red does not appear overly beefy.  Mildly scaly.  Labia majora is swollen as well.  Wet prep done.  Also has some very mild scaling redness in bilateral axillary area as well as under her breasts.         This note has been dictated and transcribed using voice recognition software.   Any errors in transcription are unintentional and inherent to the software.  Answers submitted by the patient for this visit:  General Questionnaire (Submitted on 7/24/2024)  Chief Complaint: Chronic problems general questions HPI Form  What is the reason for your visit today? : suface fungel infection under breasts and female vangel areaunder armpits  How " many servings of fruits and vegetables do you eat daily?: 0-1  On average, how many sweetened beverages do you drink each day (Examples: soda, juice, sweet tea, etc.  Do NOT count diet or artificially sweetened beverages)?: 1  How many minutes a day do you exercise enough to make your heart beat faster?: 20 to 29  How many days a week do you exercise enough to make your heart beat faster?: 6  How many days per week do you miss taking your medication?: 0

## 2024-07-29 NOTE — TELEPHONE ENCOUNTER
PA Initiation    Medication: ITRACONAZOLE 100 MG PO CAPS  Insurance Company: [employer] Part D - Phone [phone] Fax [phone]  Pharmacy Filling the Rx: [id number] - [place], [place] - [address] [place] [address] E AT [place] [address] & [facility]  Filling Pharmacy Phone: [phone]    Start Date: 7/29/2024

## 2024-07-30 NOTE — TELEPHONE ENCOUNTER
Prior Authorization Approval    Medication: ITRACONAZOLE 100 MG PO CAPS  Authorization Effective Date: 7/29/2024  Authorization Expiration Date: 12/31/2024  Approved Dose/Quantity: 14/7 days  Reference #: [id number]   Insurance Company: [employer] Part D - Phone [phone] Fax [phone]  Expected CoPay: $    CoPay Card Available:      Financial Assistance Needed: NA  Which Pharmacy is filling the prescription: [facility] #[id number] - [place] [address] [address] AT [place] [address] & [facility]  Pharmacy Notified: Yes  Patient Notified: Yes

## 2024-08-05 ENCOUNTER — TELEPHONE (OUTPATIENT)
Dept: FAMILY MEDICINE | Facility: CLINIC | Age: 64
End: 2024-08-05
Payer: COMMERCIAL

## 2024-08-05 DIAGNOSIS — B35.4 TINEA CORPORIS: ICD-10-CM

## 2024-08-05 RX ORDER — ITRACONAZOLE 100 MG/1
200 CAPSULE ORAL DAILY
Qty: 14 CAPSULE | Refills: 0 | Status: SHIPPED | OUTPATIENT
Start: 2024-08-05

## 2024-08-05 RX ORDER — PREDNISONE 20 MG/1
20 TABLET ORAL 2 TIMES DAILY
Qty: 10 TABLET | Refills: 0 | Status: SHIPPED | OUTPATIENT
Start: 2024-08-05

## 2024-08-05 NOTE — TELEPHONE ENCOUNTER
Patient called to the clinic for return of symptoms.    Patient was seen in clinic on 7/24/24 with Dr. Izaguirre. She was seen for rash and vaginal swelling and itching. She was prescribed Intraconazole which she finished on 7/31 and Prednisone which she finished on 7/29/24. Symptoms were improving . Rash under her breasts was resolved and inner thigh was improved. Patient's rash is now back, inner thighs are inflamed and itching, and has vaginal swelling and itching. She last used Vaseline on 8/2 and sitz  bath on 7/31.    Please advise. Does patient need to be seen in clinic again or try alternate medication?    [facility] is [facility] in [place]-see below.    Patient is requesting call back with plan.    Thank you,  Tania Becker RN

## 2024-08-05 NOTE — TELEPHONE ENCOUNTER
I will send refills of both medications.  If she is not improved by the time that both of these are done would recommend that she be seen again in clinic.    EB

## 2024-08-05 NOTE — TELEPHONE ENCOUNTER
Medication Question or Refill        What medication are you calling about (include dose and sig)?: itraconazole (SPORANOX) 100 MG capsule   predniSONE (DELTASONE) 20 MG tablet       Preferred Pharmacy:  [facility] #[id number] - [address] [place] [address] & [place] [address] [address]  [facility] [address]  Phone: [phone] Fax: [phone]      Controlled Substance Agreement on file:   CSA -- Patient Level:    CSA: None found at the patient level.       Who prescribed the medication?: pcp    Do you need a refill? Yes    When did you use the medication last? 6/31/2024    Patient offered an appointment? No    Do you have any questions or concerns?  Yes: fungal infection has return on8/3/2024. Could someone call her please      Okay to leave a detailed message?: Yes at Home number on file [phone] (home)

## 2024-08-05 NOTE — TELEPHONE ENCOUNTER
Call placed to patient   Relayed Dr. Izaguirre message    Patient verbalized understanding  No further questions/concerns    Rigoberto Falk, Clinic RN  [facility]

## 2024-08-10 ENCOUNTER — OFFICE VISIT (OUTPATIENT)
Dept: FAMILY MEDICINE | Facility: CLINIC | Age: 64
End: 2024-08-10
Payer: COMMERCIAL

## 2024-08-10 ENCOUNTER — NURSE TRIAGE (OUTPATIENT)
Dept: NURSING | Facility: CLINIC | Age: 64
End: 2024-08-10
Payer: COMMERCIAL

## 2024-08-10 VITALS
DIASTOLIC BLOOD PRESSURE: 82 MMHG | RESPIRATION RATE: 20 BRPM | HEART RATE: 58 BPM | TEMPERATURE: 98 F | OXYGEN SATURATION: 97 % | SYSTOLIC BLOOD PRESSURE: 142 MMHG

## 2024-08-10 DIAGNOSIS — Z13.1 ENCOUNTER FOR SCREENING FOR DIABETES MELLITUS: ICD-10-CM

## 2024-08-10 DIAGNOSIS — R73.03 PREDIABETES: ICD-10-CM

## 2024-08-10 DIAGNOSIS — T78.3XXA ANGIOEDEMA, INITIAL ENCOUNTER: Primary | ICD-10-CM

## 2024-08-10 DIAGNOSIS — R63.1 POLYDIPSIA: ICD-10-CM

## 2024-08-10 LAB
ALBUMIN SERPL BCG-MCNC: 4.2 G/DL (ref 3.5–5.2)
ALP SERPL-CCNC: 66 U/L (ref 40–150)
ALT SERPL W P-5'-P-CCNC: 21 U/L (ref 0–50)
ANION GAP SERPL CALCULATED.3IONS-SCNC: 8 MMOL/L (ref 7–15)
AST SERPL W P-5'-P-CCNC: 16 U/L (ref 0–45)
BILIRUB SERPL-MCNC: 0.5 MG/DL
BUN SERPL-MCNC: 23.4 MG/DL (ref 8–23)
CALCIUM SERPL-MCNC: 9.5 MG/DL (ref 8.8–10.4)
CHLORIDE SERPL-SCNC: 106 MMOL/L (ref 98–107)
CREAT SERPL-MCNC: 0.81 MG/DL (ref 0.51–0.95)
EGFRCR SERPLBLD CKD-EPI 2021: 80 ML/MIN/1.73M2
GLUCOSE SERPL-MCNC: 112 MG/DL (ref 70–99)
HBA1C MFR BLD: 6.1 % (ref 0–5.6)
HCO3 SERPL-SCNC: 27 MMOL/L (ref 22–29)
POTASSIUM SERPL-SCNC: 3.9 MMOL/L (ref 3.4–5.3)
PROT SERPL-MCNC: 6.9 G/DL (ref 6.4–8.3)
SODIUM SERPL-SCNC: 141 MMOL/L (ref 135–145)

## 2024-08-10 PROCEDURE — 80053 COMPREHEN METABOLIC PANEL: CPT | Performed by: STUDENT IN AN ORGANIZED HEALTH CARE EDUCATION/TRAINING PROGRAM

## 2024-08-10 PROCEDURE — 36415 COLL VENOUS BLD VENIPUNCTURE: CPT | Performed by: STUDENT IN AN ORGANIZED HEALTH CARE EDUCATION/TRAINING PROGRAM

## 2024-08-10 PROCEDURE — 83036 HEMOGLOBIN GLYCOSYLATED A1C: CPT | Performed by: STUDENT IN AN ORGANIZED HEALTH CARE EDUCATION/TRAINING PROGRAM

## 2024-08-10 PROCEDURE — 99214 OFFICE O/P EST MOD 30 MIN: CPT | Performed by: STUDENT IN AN ORGANIZED HEALTH CARE EDUCATION/TRAINING PROGRAM

## 2024-08-10 NOTE — TELEPHONE ENCOUNTER
Nurse Triage SBAR    Is this a 2nd Level Triage? NO    Situation: Patient having swelling in her face and around her eyes  Consent: not needed    Background: Itraconazole and Prednisone - first course started on 7/24/24 (7 days) second course started 8/5/24 (7 days)    Assessment:     9 am today- eyes are swollen and face was swollen  (noticed yesterday) with flushing to left cheek yesterday  - eyes are a little itchy     Able to swallow  Denies trouble breathing or new cough    Swelling extending from her Eyes down to the throat and back by her ears  Not painful   No fever   Denies additional symptoms    Protocol Recommended Disposition:       Recommendation: Advised to be seen in UC today- reviewed additional care advice with patient and she verbalizes understanding. Declines additional questions. Assisted in locating closest  to her- [place]. Will go there now.      UC today    Does the patient meet one of the following criteria for ADS visit consideration? 16+ years old, with an [facility] PCP       Nikki Wang RN 9:22 AM 8/10/2024  Reason for Disposition   Face swelling began after taking a drug    Additional Information   Negative: [1] Life-threatening reaction (anaphylaxis) in the past to similar substance (e.g., food, insect bite/sting, chemical, etc.) AND [2] < 2 hours since exposure   Negative: Unresponsive, passed out or very weak   Negative: Swollen tongue   Negative: Difficulty breathing or wheezing   Negative: Sounds like a life-threatening emergency to the triager   Negative: Followed a face injury   Negative: [1] Bee sting AND [2] within last 24 hours   Negative: Insect bite suspected   Negative: Swelling mainly of lip(s)   Negative: Swelling mainly around the eyes   Negative: [1] SEVERE swelling of entire face AND [2] < 2 hours since exposure to high-risk allergen (e.g., peanuts, tree nuts, fish, shellfish or 1st dose of drug) AND [3] no serious symptoms AND [4] no serious allergic reaction in  the past   Negative: Fever   Negative: Taking an ACE Inhibitor medicine (e.g., benazepril / LOTENSIN, captopril / CAPOTEN, enalapril / VASOTEC, lisinopril / ZESTRIL)   Negative: Patient sounds very sick or weak to the triager   Negative: Pregnant 20 or more weeks   Negative: Postpartum (from 0 to 6 weeks after delivery)   Negative: SEVERE swelling of the entire face   Negative: [1] Swelling is red AND [2] very painful to touch    Protocols used: Face Swelling-A-AH

## 2024-08-10 NOTE — PATIENT INSTRUCTIONS
Benadryl 25-50 mg every 6 hours until swelling is resolved.      Stop itraconazole.    A1C today is 6.1.  Cut down on carbohydrates in diet and continue to exercise regularly particularly when no longer working.    Paula Silver, CNP

## 2024-08-10 NOTE — PROGRESS NOTES
"Assessment & Plan     Angioedema, initial encounter  Patient is on itraconazole for treatment of candidal intertrigo and prednisone. She took last dose of prednisone this morning at 5 and woke up at 9 with right eye swollen shut and left cheek swelling. She has had no swelling of the lips, tongue or throat or respiratory symptoms. Lungs clear on exam. Advised stopping itraconazole (prednisone already completed), taking benadryl 25-50 mg every 6 hours until swelling resolves and follow up with primary care for further treatment of intertrigo.   - Comprehensive metabolic panel (BMP + Alb, Alk Phos, ALT, AST, Total. Bili, TP)  - Comprehensive metabolic panel (BMP + Alb, Alk Phos, ALT, AST, Total. Bili, TP)    Prediabetes  Polydipsia  Patient reports dry mouth and frequent urination with family history of diabetes and candidal infection resisting improvement. I advised r/o diabetes as possible underlying cause for persistence of candidal infection. A1C is 6.1 which is in the prediabetic range. I advised changes to diet including cutting down on carbohydrates, eating more lean meats and vegetables, and exercising regularly to help prevent type 2 diabetes. She has follow up appointment with PCP in 2 months.   - Hemoglobin A1c  - Hemoglobin A1c    Encounter for screening for diabetes mellitus  - Hemoglobin A1c  - Hemoglobin A1c     30 minutes spent on the date of the encounter doing chart review, review of test results, interpretation of tests, patient visit, and documentation       No follow-ups on file.    GRACIELA Wilkins [facility] KIRBY Kitchen is a 64 year old female who presents to clinic today for the following health issues:  Chief Complaint   Patient presents with    Allergic Reaction     Swollen face and neck x yesterday. Pt states noticed Lt cheeks flushed yesterday and Rt eye swollen and \"completely shut\" this morning but has gone down since. Poss reaction to " itraconazole and prednisone       HPI      Review of Systems  Constitutional, HEENT, cardiovascular, pulmonary, GI, , musculoskeletal, neuro, skin, endocrine and psych systems are negative, except as otherwise noted.      Objective    BP (!) 142/82   Pulse 58   Temp 98  F (36.7  C) (Tympanic)   Resp 20   SpO2 97%   Physical Exam   GENERAL: alert and no distress  EYES: Eyes grossly normal to inspection, PERRL and conjunctivae and sclerae normal  HENT: ear canals and TM's normal, nose and mouth without ulcers or lesions  NECK: no adenopathy, no asymmetry, masses, or scars  RESP: lungs clear to auscultation - no rales, rhonchi or wheezes  CV: regular rate and rhythm, normal S1 S2, no S3 or S4, no murmur, click or rub, no peripheral edema  ABDOMEN: soft, nontender, no hepatosplenomegaly, no masses and bowel sounds normal  MS: no gross musculoskeletal defects noted, no edema  SKIN: no suspicious lesions or rashes  NEURO: Normal strength and tone, mentation intact and speech normal  PSYCH: mentation appears normal, affect normal/bright    Results for orders placed or performed in visit on 08/10/24 (from the past 24 hour(s))   Hemoglobin A1c   Result Value Ref Range    Hemoglobin A1C 6.1 (H) 0.0 - 5.6 %   Comprehensive metabolic panel (BMP + Alb, Alk Phos, ALT, AST, Total. Bili, TP)   Result Value Ref Range    Sodium 141 135 - 145 mmol/L    Potassium 3.9 3.4 - 5.3 mmol/L    Carbon Dioxide (CO2) 27 22 - 29 mmol/L    Anion Gap 8 7 - 15 mmol/L    Urea Nitrogen 23.4 (H) 8.0 - 23.0 mg/dL    Creatinine 0.81 0.51 - 0.95 mg/dL    GFR Estimate 80 >60 mL/min/1.73m2    Calcium 9.5 8.8 - 10.4 mg/dL    Chloride 106 98 - 107 mmol/L    Glucose 112 (H) 70 - 99 mg/dL    Alkaline Phosphatase 66 40 - 150 U/L    AST 16 0 - 45 U/L    ALT 21 0 - 50 U/L    Protein Total 6.9 6.4 - 8.3 g/dL    Albumin 4.2 3.5 - 5.2 g/dL    Bilirubin Total 0.5 <=1.2 mg/dL

## 2024-09-16 ENCOUNTER — TELEPHONE (OUTPATIENT)
Dept: FAMILY MEDICINE | Facility: CLINIC | Age: 64
End: 2024-09-16
Payer: COMMERCIAL

## 2024-09-16 NOTE — TELEPHONE ENCOUNTER
Can you guys contact this patient.    She had an appointment with me about 2 months ago for a rash.  The prescription that I sent did not seem to help that much and I think that she needs to see dermatologist as a next step.    The station  contacted her but it sounded this so she had some clinical questions (and was a little bit upset for some reason?).  I do not feel as though I could adequately treat her again without having a visit to further evaluate.  If this is something that she would like I am more than happy to try to fit her in the schedule this week but I think that a urgent dermatology referral would be more appropriate.    Thanks    EB

## 2024-09-16 NOTE — TELEPHONE ENCOUNTER
Patient stopped by the clinic last week.    She was still having concerns regarding her fungal skin infection.  I would recommend that she see a dermatologist at this point as she has seen several providers here in the clinic and we have not been able to fully take care of her issue.    If she okay if I placed a referral for dermatology for her?  Does she have her preferred location?    EB

## 2024-09-16 NOTE — TELEPHONE ENCOUNTER
When patient calls back regarding your appointment please let her know that a slightly elevated A1c would likely not increase her risk for fungal infections very much.    Poorly controlled diabetes does increase risk for infections because it increases the sugar on the skin but I am not sure that her A1c of 6.1 is playing into this very much.    EB

## 2024-09-16 NOTE — TELEPHONE ENCOUNTER
Patient is frustrated that she is allergic to the antifungals.  Has tried multiple OTC antifungals and prescription medications that have not worked.  A Pharmacist had recommended that she keep using an antifungal and take a benadryl.  Patient is afraid of trying this as she works full time.  Relayed Dr Izaguirre message.  Patient is going to call her insurance and see if they will cover urgent derm appointment.    Patient wondering if elevated A1C could have caused fungal infection.  Dillon Duffy RN

## 2024-09-16 NOTE — TELEPHONE ENCOUNTER
Called pt and asked her if she would like a referral to a dermatologist.    Pt got mad and said what can they do, prescribe me medication? I can't take medication. I said they might have a cream . She said I can't use that either.    Pt also wants to know if this could be from her A1c being high? And do you think this will go away on its own.      Pt also states that she is not sure if her insurance will cover a dermatologist. And she can't afford it.    Told pt that she would have to check with her insurance to confirm that.    Pt very frustrated and has a lot of questions that I could not answer.    Please call pt back or a RN to call back??

## 2024-10-10 ENCOUNTER — TRANSFERRED RECORDS (OUTPATIENT)
Dept: HEALTH INFORMATION MANAGEMENT | Facility: CLINIC | Age: 64
End: 2024-10-10
Payer: COMMERCIAL

## 2024-10-28 ENCOUNTER — OFFICE VISIT (OUTPATIENT)
Dept: FAMILY MEDICINE | Facility: CLINIC | Age: 64
End: 2024-10-28
Payer: COMMERCIAL

## 2024-10-28 VITALS
TEMPERATURE: 97.4 F | BODY MASS INDEX: 24.5 KG/M2 | DIASTOLIC BLOOD PRESSURE: 78 MMHG | OXYGEN SATURATION: 99 % | RESPIRATION RATE: 12 BRPM | WEIGHT: 138.25 LBS | HEART RATE: 60 BPM | SYSTOLIC BLOOD PRESSURE: 110 MMHG | HEIGHT: 63 IN

## 2024-10-28 DIAGNOSIS — Z12.11 SCREEN FOR COLON CANCER: ICD-10-CM

## 2024-10-28 DIAGNOSIS — Z78.0 POST-MENOPAUSAL: ICD-10-CM

## 2024-10-28 DIAGNOSIS — Z00.00 ENCOUNTER FOR MEDICARE ANNUAL WELLNESS EXAM: Primary | ICD-10-CM

## 2024-10-28 DIAGNOSIS — Z23 NEED FOR SHINGLES VACCINE: ICD-10-CM

## 2024-10-28 DIAGNOSIS — Z23 NEED FOR TDAP VACCINATION: ICD-10-CM

## 2024-10-28 DIAGNOSIS — R73.9 ELEVATED BLOOD SUGAR: ICD-10-CM

## 2024-10-28 DIAGNOSIS — Z13.220 SCREENING FOR HYPERLIPIDEMIA: ICD-10-CM

## 2024-10-28 LAB
ALBUMIN SERPL BCG-MCNC: 4.3 G/DL (ref 3.5–5.2)
ALP SERPL-CCNC: 92 U/L (ref 40–150)
ALT SERPL W P-5'-P-CCNC: 34 U/L (ref 0–50)
ANION GAP SERPL CALCULATED.3IONS-SCNC: 7 MMOL/L (ref 7–15)
AST SERPL W P-5'-P-CCNC: 25 U/L (ref 0–45)
BILIRUB SERPL-MCNC: 0.3 MG/DL
BUN SERPL-MCNC: 19.9 MG/DL (ref 8–23)
CALCIUM SERPL-MCNC: 9.7 MG/DL (ref 8.8–10.4)
CHLORIDE SERPL-SCNC: 105 MMOL/L (ref 98–107)
CHOLEST SERPL-MCNC: 196 MG/DL
CREAT SERPL-MCNC: 0.83 MG/DL (ref 0.51–0.95)
EGFRCR SERPLBLD CKD-EPI 2021: 77 ML/MIN/1.73M2
EST. AVERAGE GLUCOSE BLD GHB EST-MCNC: 120 MG/DL
FASTING STATUS PATIENT QL REPORTED: YES
FASTING STATUS PATIENT QL REPORTED: YES
GLUCOSE SERPL-MCNC: 112 MG/DL (ref 70–99)
HBA1C MFR BLD: 5.8 % (ref 0–5.6)
HCO3 SERPL-SCNC: 27 MMOL/L (ref 22–29)
HDLC SERPL-MCNC: 72 MG/DL
LDLC SERPL CALC-MCNC: 110 MG/DL
NONHDLC SERPL-MCNC: 124 MG/DL
POTASSIUM SERPL-SCNC: 4.3 MMOL/L (ref 3.4–5.3)
PROT SERPL-MCNC: 7.1 G/DL (ref 6.4–8.3)
SODIUM SERPL-SCNC: 139 MMOL/L (ref 135–145)
TRIGL SERPL-MCNC: 69 MG/DL

## 2024-10-28 PROCEDURE — G0438 PPPS, INITIAL VISIT: HCPCS | Performed by: FAMILY MEDICINE

## 2024-10-28 PROCEDURE — 80053 COMPREHEN METABOLIC PANEL: CPT | Performed by: FAMILY MEDICINE

## 2024-10-28 PROCEDURE — 80061 LIPID PANEL: CPT | Performed by: FAMILY MEDICINE

## 2024-10-28 PROCEDURE — 36415 COLL VENOUS BLD VENIPUNCTURE: CPT | Performed by: FAMILY MEDICINE

## 2024-10-28 PROCEDURE — 83036 HEMOGLOBIN GLYCOSYLATED A1C: CPT | Performed by: FAMILY MEDICINE

## 2024-10-28 RX ORDER — NYSTATIN 100000 U/G
CREAM TOPICAL
COMMUNITY
Start: 2024-10-10

## 2024-10-28 SDOH — HEALTH STABILITY: PHYSICAL HEALTH: ON AVERAGE, HOW MANY MINUTES DO YOU ENGAGE IN EXERCISE AT THIS LEVEL?: 150+ MIN

## 2024-10-28 SDOH — HEALTH STABILITY: PHYSICAL HEALTH: ON AVERAGE, HOW MANY DAYS PER WEEK DO YOU ENGAGE IN MODERATE TO STRENUOUS EXERCISE (LIKE A BRISK WALK)?: 7 DAYS

## 2024-10-28 ASSESSMENT — PATIENT HEALTH QUESTIONNAIRE - PHQ9
SUM OF ALL RESPONSES TO PHQ QUESTIONS 1-9: 0
SUM OF ALL RESPONSES TO PHQ QUESTIONS 1-9: 0

## 2024-10-28 ASSESSMENT — SOCIAL DETERMINANTS OF HEALTH (SDOH): HOW OFTEN DO YOU GET TOGETHER WITH FRIENDS OR RELATIVES?: MORE THAN THREE TIMES A WEEK

## 2024-10-28 NOTE — PROGRESS NOTES
Preventive Care Visit  [facility] BLAZE Izaguirre MD, Family Medicine  Oct 28, 2024      Assessment & Plan     Encounter for Medicare annual wellness exam    Need for shingles vaccine  Dicussed and declined    Need for Tdap vaccination  Dicussed and declined    Screen for colon cancer  Dicussed and declined    Elevated blood sugar  Patient has made some big lifestyle changes.    Lab Results   Component Value Date    A1C 5.8 10/28/2024    A1C 6.1 08/10/2024    A1C 5.6 09/01/2023       - COMPREHENSIVE METABOLIC PANEL; Future  - HEMOGLOBIN A1C; Future  - COMPREHENSIVE METABOLIC PANEL  - HEMOGLOBIN A1C    Screening for hyperlipidemia  - Lipid panel reflex to direct LDL Fasting; Future  - Lipid panel reflex to direct LDL Fasting    Post-menopausal  - DEXA HIP/PELVIS/SPINE - Future; Future    Patient has been advised of split billing requirements and indicates understanding: Yes        Counseling  Appropriate preventive services were addressed with this patient via screening, questionnaire, or discussion as appropriate for fall prevention, nutrition, physical activity, Tobacco-use cessation, social engagement, weight loss and cognition.  Checklist reviewing preventive services available has been given to the patient.  Reviewed patient's diet, addressing concerns and/or questions.   The patient was instructed to see the dentist every 6 months.   Information on urinary incontinence and treatment options given to patient.           Rosanne Kitchen is a 64 year old, presenting for the following:  Medicare Visit (AWV-Px/Fasting for labs today)            HPI  She is overall doing well.  Family history of diabetes.  Most recent A1c was 6.1 at urgent care.  Since that time, about 3 months ago, she has made some fairly significant lifestyle changes and is hopeful to get this rechecked today.          Health Care Directive  Patient has a Health Care Directive on file  Advance care planning document  is on file and is current.      10/28/2024   General Health   How would you rate your overall physical health? Excellent   Feel stress (tense, anxious, or unable to sleep) Not at all            10/28/2024   Nutrition   Diet: Carbohydrate counting            10/28/2024   Exercise   Days per week of moderate/strenous exercise 7 days   Average minutes spent exercising at this level 150+ min            10/28/2024   Social Factors   Frequency of gathering with friends or relatives More than three times a week   Worry food won't last until get money to buy more No   Food not last or not have enough money for food? No   Do you have housing? (Housing is defined as stable permanent housing and does not include staying ouside in a car, in a tent, in an abandoned building, in an overnight shelter, or couch-surfing.) Yes   Are you worried about losing your housing? No   Lack of transportation? No   Unable to get utilities (heat,electricity)? No            10/28/2024   Fall Risk   Fallen 2 or more times in the past year? No     No    Trouble with walking or balance? No     No        Patient-reported    Multiple values from one day are sorted in reverse-chronological order          10/28/2024   Activities of Daily Living- Home Safety   Needs help with the following daily activites None of the above   Safety concerns in the home None of the above            10/28/2024   Dental   Dentist two times every year? (!) NO            10/28/2024   Hearing Screening   Hearing concerns? None of the above            10/28/2024   Driving Risk Screening   Patient/family members have concerns about driving No            10/28/2024   General Alertness/Fatigue Screening   Have you been more tired than usual lately? No            10/28/2024   Urinary Incontinence Screening   Bothered by leaking urine in past 6 months Yes            10/28/2024   TB Screening   Were you born outside of the [place]? No          Today's PHQ-9 Score:       10/28/2024      7:56 AM   PHQ-9 SCORE   PHQ-9 Total Score MyChart 0   PHQ-9 Total Score 0        Patient-reported         10/28/2024   Substance Use   Alcohol more than 3/day or more than 7/wk Not Applicable   Do you have a current opioid prescription? No   How severe/bad is pain from 1 to 10? 0/10 (No Pain)   Do you use any other substances recreationally? No        Social History     Tobacco Use    Smoking status: Former     Current packs/day: 0.00     Types: Cigarettes     Quit date: 3/16/2018     Years since quittin.6    Smokeless tobacco: Never   Vaping Use    Vaping status: Never Used   Substance Use Topics    Alcohol use: No    Drug use: No           10/14/2023   LAST FHS-7 RESULTS   1st degree relative breast or ovarian cancer No   Any relative bilateral breast cancer Unknown   Any male have breast cancer No   Any ONE woman have BOTH breast AND ovarian cancer Yes   Any woman with breast cancer before 50yrs Unknown   2 or more relatives with breast AND/OR ovarian cancer No   2 or more relatives with breast AND/OR bowel cancer No           Mammogram Screening - Mammogram every 1-2 years updated in Health Maintenance based on mutual decision making      History of abnormal Pap smear: No - age 30- 64 PAP with HPV every 5 years recommended       ASCVD Risk   The 10-year ASCVD risk score (Svetlana DK, et al., 2019) is: 4.2%    Values used to calculate the score:      Age: 64 years      Sex: Female      Is Non- : No      Diabetic: No      Tobacco smoker: No      Systolic Blood Pressure: 110 mmHg      Is BP treated: No      HDL Cholesterol: 70 mg/dL      Total Cholesterol: 193 mg/dL    Recommended bone density scanning        Reviewed and updated as needed this visit by Provider                      Current providers sharing in care for this patient include:  Patient Care Team:  Anyi Izaguirre MD as PCP - General (Family Practice)  Anyi Izaguirre MD as Assigned PCP    The  "following health maintenance items are reviewed in Epic and correct as of today:  Health Maintenance   Topic Date Due    DEXA  Never done    DEPRESSION ACTION PLAN  Never done    COVID-19 Vaccine (1) Never done    COLORECTAL CANCER SCREENING  Never done    HEPATITIS C SCREENING  Never done    ZOSTER IMMUNIZATION (1 of 2) Never done    LUNG CANCER SCREENING  Never done    Pneumococcal Vaccine: 65+ Years (1 of 1 - PCV) Never done    DTAP/TDAP/TD IMMUNIZATION (2 - Td or Tdap) 02/13/2024    ANNUAL REVIEW OF HM ORDERS  09/01/2024    INFLUENZA VACCINE (1) Never done    PHQ-9  04/28/2025    MAMMO SCREENING  10/14/2025    MEDICARE ANNUAL WELLNESS VISIT  10/28/2025    FALL RISK ASSESSMENT  10/28/2025    GLUCOSE  08/10/2027    LIPID  09/01/2028    ADVANCE CARE PLANNING  09/12/2028    RSV VACCINE (1 - 1-dose 75+ series) 04/10/2033    HPV IMMUNIZATION  Aged Out    MENINGITIS IMMUNIZATION  Aged Out    RSV MONOCLONAL ANTIBODY  Aged Out    PAP  Discontinued         Review of Systems  Constitutional, HEENT, cardiovascular, pulmonary, GI, , musculoskeletal, neuro, skin, endocrine and psych systems are negative, except as otherwise noted.     Objective    Exam  /78   Pulse 60   Temp 97.4  F (36.3  C) (Tympanic)   Resp 12   Ht 1.59 m (5' 2.6\")   Wt 62.7 kg (138 lb 4 oz)   SpO2 99%   BMI 24.81 kg/m     Estimated body mass index is 24.81 kg/m  as calculated from the following:    Height as of this encounter: 1.59 m (5' 2.6\").    Weight as of this encounter: 62.7 kg (138 lb 4 oz).    Physical Exam    Physical Exam:  General Appearance: Alert, cooperative, no distress, appears stated age   Head: Normocephalic, without obvious abnormality, atraumatic  Eyes: PERRL, conjunctiva/corneas clear, EOM's intact   Ears: Normal TM's and external ear canals, both ears  Nose:Nares normal, septum midline,mucosa normal, no drainage    Throat:Lips, mucosa, and tongue normal; teeth and gums normal  Neck: Supple, symmetrical, trachea " midline, no adenopathy;  thyroid: not enlarged, symmetric, no tenderness/mass/nodules  Back: Symmetric, no curvature, ROM normal,  Lungs: Clear to auscultation bilaterally, respirations unlabored  Breasts: No breast masses, tenderness, asymmetry, or nipple discharge.  Heart: Regular rate and rhythm, S1 and S2 normal, no murmur, rub, or gallop  Abdomen: Soft, non-tender, bowel sounds active all four quadrants,  no masses, no organomegaly  Extremities: Extremities normal, atraumatic, no cyanosis or edema  Skin: Skin color, texture, turgor normal, no rashes or lesions  Lymph nodes: Cervical, supraclavicular, and axillary nodes normal and   Neurologic: Normal          10/28/2024   Mini Cog   Clock Draw Score 2 Normal   3 Item Recall 3 objects recalled   Mini Cog Total Score 5                 Signed Electronically by: Anyi Izaguirre MD

## 2024-10-28 NOTE — PATIENT INSTRUCTIONS
Patient Education   Preventive Care Advice   This is general advice given by our system to help you stay healthy. However, your care team may have specific advice just for you. Please talk to your care team about your preventive care needs.  Nutrition  Eat 5 or more servings of fruits and vegetables each day.  Try wheat bread, brown rice and whole grain pasta (instead of white bread, rice, and pasta).  Get enough calcium and vitamin D. Check the label on foods and aim for 100% of the RDA (recommended daily allowance).  Lifestyle  Exercise at least 150 minutes each week  (30 minutes a day, 5 days a week).  Do muscle strengthening activities 2 days a week. These help control your weight and prevent disease.  No smoking.  Wear sunscreen to prevent skin cancer.  Have a dental exam and cleaning every 6 months.  Yearly exams  See your health care team every year to talk about:  Any changes in your health.  Any medicines your care team has prescribed.  Preventive care, family planning, and ways to prevent chronic diseases.  Shots (vaccines)   HPV shots (up to age 26), if you've never had them before.  Hepatitis B shots (up to age 59), if you've never had them before.  COVID-19 shot: Get this shot when it's due.  Flu shot: Get a flu shot every year.  Tetanus shot: Get a tetanus shot every 10 years.  Pneumococcal, hepatitis A, and RSV shots: Ask your care team if you need these based on your risk.  Shingles shot (for age 50 and up)  General health tests  Diabetes screening:  Starting at age 35, Get screened for diabetes at least every 3 years.  If you are younger than age 35, ask your care team if you should be screened for diabetes.  Cholesterol test: At age 39, start having a cholesterol test every 5 years, or more often if advised.  Bone density scan (DEXA): At age 50, ask your care team if you should have this scan for osteoporosis (brittle bones).  Hepatitis C: Get tested at least once in your life.  STIs (sexually  transmitted infections)  Before age 24: Ask your care team if you should be screened for STIs.  After age 24: Get screened for STIs if you're at risk. You are at risk for STIs (including HIV) if:  You are sexually active with more than one person.  You don't use condoms every time.  You or a partner was diagnosed with a sexually transmitted infection.  If you are at risk for HIV, ask about PrEP medicine to prevent HIV.  Get tested for HIV at least once in your life, whether you are at risk for HIV or not.  Cancer screening tests  Cervical cancer screening: If you have a cervix, begin getting regular cervical cancer screening tests starting at age 21.  Breast cancer scan (mammogram): If you've ever had breasts, begin having regular mammograms starting at age 40. This is a scan to check for breast cancer.  Colon cancer screening: It is important to start screening for colon cancer at age 45.  Have a colonoscopy test every 10 years (or more often if you're at risk) Or, ask your provider about stool tests like a FIT test every year or Cologuard test every 3 years.  To learn more about your testing options, visit:   .  For help making a decision, visit:   https://bit.ly/kt37921.  Prostate cancer screening test: If you have a prostate, ask your care team if a prostate cancer screening test (PSA) at age 55 is right for you.  Lung cancer screening: If you are a current or former smoker ages 50 to 80, ask your care team if ongoing lung cancer screenings are right for you.  For informational purposes only. Not to replace the advice of your health care provider. Copyright   2023 [facility] [employer]. All rights reserved. Clinically reviewed by the [facility]. [employer] [id number] - REV 01/24.  Bladder Training: Care Instructions  Your Care Instructions     Bladder training is used to treat urge incontinence and stress incontinence. Urge incontinence means that the need to urinate comes on so fast  that you can't get to a toilet in time. Stress incontinence means that you leak urine because of pressure on your bladder. For example, it may happen when you laugh, cough, or lift something heavy.  Bladder training can increase how long you can wait before you have to urinate. It can also help your bladder hold more urine. And it can give you better control over the urge to urinate.  It is important to remember that bladder training takes a few weeks to a few months to make a difference. You may not see results right away, but don't give up.  Follow-up care is a key part of your treatment and safety. Be sure to make and go to all appointments, and call your doctor if you are having problems. It's also a good idea to know your test results and keep a list of the medicines you take.  How can you care for yourself at home?  Work with your doctor to come up with a bladder training program that is right for you. You may use one or more of the following methods.  Delayed urination  In the beginning, try to keep from urinating for 5 minutes after you first feel the need to go.  While you wait, take deep, slow breaths to relax. Kegel exercises can also help you delay the need to go to the bathroom.  After some practice, when you can easily wait 5 minutes to urinate, try to wait 10 minutes before you urinate.  Slowly increase the waiting period until you are able to control when you have to urinate.  Scheduled urination  Empty your bladder when you first wake up in the morning.  Schedule times throughout the day when you will urinate.  Start by going to the bathroom every hour, even if you don't need to go.  Slowly increase the time between trips to the bathroom.  When you have found a schedule that works well for you, keep doing it.  If you wake up during the night and have to urinate, do it. Apply your schedule to waking hours only.  Kegel exercises  These tighten and strengthen pelvic muscles, which can help you control  "the flow of urine. (If doing these exercises causes pain, stop doing them and talk with your doctor.) To do Kegel exercises:  Squeeze your muscles as if you were trying not to pass gas. Or squeeze your muscles as if you were stopping the flow of urine. Your belly, legs, and buttocks shouldn't move.  Hold the squeeze for 3 seconds, then relax for 5 to 10 seconds.  Start with 3 seconds, then add 1 second each week until you are able to squeeze for 10 seconds.  Repeat the exercise 10 times a session. Do 3 to 8 sessions a day.  When should you call for help?  Watch closely for changes in your health, and be sure to contact your doctor if:    Your incontinence is getting worse.     You do not get better as expected.   Where can you learn more?  Go to https://www.[employer].net/patiented  Enter V684 in the search box to learn more about \"Bladder Training: Care Instructions.\"  Current as of: November 15, 2023  Content Version: 14.2 2024 [facility] [employer].   Care instructions adapted under license by your healthcare professional. If you have questions about a medical condition or this instruction, always ask your healthcare professional. [employer] disclaims any warranty or liability for your use of this information.       "

## 2025-02-17 ENCOUNTER — HOSPITAL ENCOUNTER (OUTPATIENT)
Dept: BONE DENSITY | Facility: HOSPITAL | Age: 65
Discharge: HOME | End: 2025-02-17
Attending: FAMILY MEDICINE | Admitting: FAMILY MEDICINE
Payer: COMMERCIAL

## 2025-02-17 DIAGNOSIS — Z78.0 POST-MENOPAUSAL: ICD-10-CM

## 2025-02-17 PROCEDURE — 77091 TBS TECHL CALCULATION ONLY: CPT

## 2025-02-24 ENCOUNTER — NURSE TRIAGE (OUTPATIENT)
Dept: FAMILY MEDICINE | Facility: CLINIC | Age: 65
End: 2025-02-24
Payer: COMMERCIAL

## 2025-02-24 NOTE — TELEPHONE ENCOUNTER
"Nurse Triage SBAR    Is this a 2nd Level Triage? NO    Situation: Diarrhea    Background: Started over the weekend. Seems to be getting better today.    Assessment: States this started Friday with a very soft stool. Saturday turned to diarrhea and Sunday was \"pure liquid.\" States she did have some abdominal pain yesterday that has turned into soreness from the diarrhea. States she has had 3 episodes since Friday. Denies any signs of dehydration. Denies fever.    Protocol Recommended Disposition:   Home Care    Recommendation: States she thinks this is from her new supplements that were prescribed by Dr Izaguirre last week. States she started on calcium 12 mg and D3 800 international unit(s). She wonders if this dosage needs to be lower.    Routed to provider    Does the patient meet one of the following criteria for ADS visit consideration? 16+ years old, with an [facility] PCP     TIP  Providers, please consider if this condition is appropriate for management at one of our Acute and Diagnostic Services sites.     If patient is a good candidate, please use dotphrase <dot>triageresponse and select Refer to ADS to document.      Reason for Disposition   MILD-MODERATE diarrhea (e.g., 1-6 times / day more than normal)    Additional Information   Negative: Diarrhea is a chronic symptom (recurrent or ongoing AND lasting > 4 weeks)   Negative: SEVERE diarrhea (e.g., 7 or more times / day more than normal)   Negative: MILD diarrhea (e.g., 1-3 or more stools than normal in past 24 hours) diarrhea and present > 7 days  (Exception: Chronic diarrhea that is not worse.)   Negative: Patient wants to be seen   Negative: Travel to a foreign country in past month   Negative: Recent antibiotic therapy (i.e., within last 2 months) and diarrhea present > 3 days since antibiotic was stopped   Negative: Recent hospitalization and diarrhea present > 3 days   Negative: Tube feedings (e.g., nasogastric, g-tube, j-tube)   Negative: SEVERE diarrhea " (e.g., 7 or more times / day more than normal) and present > 24 hours (1 day)   Negative: MODERATE diarrhea (e.g., 4-6 times / day more than normal) and present > 48 hours (2 days)   Negative: MODERATE diarrhea (e.g., 4-6 times / day more than normal) and age > 70 years   Negative: Abdominal pain  (Exceptions: Pain clears completely with each passage of diarrhea stool,  or symptoms similar to previously diagnosed irritable bowel syndrome.)   Negative: Fever > 101 F (38.3 C)   Negative: Blood in the stool  (Exception: Only on toilet paper. Reason: Diarrhea can cause rectal irritation with blood on wiping.)   Negative: Mucus or pus in stool has been present > 2 days and diarrhea is more than mild   Negative: Weak immune system (e.g., HIV positive, cancer chemo, splenectomy, organ transplant, chronic steroids)   Negative: SEVERE diarrhea (e.g., 7 or more times / day more than normal) and age > 60 years   Negative: Constant abdominal pain lasting > 2 hours   Negative: Drinking very little and dehydration suspected (e.g., no urine > 12 hours, very dry mouth, very lightheaded)   Negative: Patient sounds very sick or weak to the triager   Negative: SEVERE abdominal pain (e.g., excruciating) and present > 1 hour   Negative: SEVERE abdominal pain and age > 60 years   Negative: Bloody, black, or tarry bowel movements  (Exception: Chronic-unchanged black-grey bowel movements and is taking iron pills or Pepto-Bismol.)   Negative: Vomiting also present and worse than the diarrhea   Negative: Blood in stool and without diarrhea   Negative: Diarrhea begins while taking an antibiotic by mouth (oral antibiotic)   Negative: Shock suspected (e.g., cold/pale/clammy skin, too weak to stand, low BP, rapid pulse)   Negative: Difficult to awaken or acting confused (e.g., disoriented, slurred speech)   Negative: Sounds like a life-threatening emergency to the triager    Protocols used: Diarrhea-A-OH

## 2025-02-24 NOTE — TELEPHONE ENCOUNTER
Thank you for the message.  I have not heard of calcium and vitamin D causing the symptoms but certainly everyone is a bit different.    I would recommend that she wait until she is having regular bowel movements again.  She is to start with 1/2 tablet of her current dose.  If she tolerates this well for a week then she can increase to a full tablet.    If she does not want to proceed this way she could increase the calcium and vitamin D in her diet.  Please ensure you are getting an adequate amount of calcium (1,200 mg) and vitamin D (800 IU) daily.    Thanks    EB

## 2025-04-15 ENCOUNTER — TELEPHONE (OUTPATIENT)
Dept: FAMILY MEDICINE | Facility: CLINIC | Age: 65
End: 2025-04-15
Payer: COMMERCIAL

## 2025-04-15 NOTE — TELEPHONE ENCOUNTER
General Call      Reason for Call:  Patient asking if it's ok to use Viactiv chewables for calcium     What are your questions or concerns:  diabetic    Date of last appointment with provider: 10/28/24    Okay to leave a detailed message?: Yes at Cell number on file:    Telephone Information:   Mobile [phone]

## 2025-04-16 NOTE — TELEPHONE ENCOUNTER
Spoke with pt and informed of the following Per Dexa scan dictation :   Please ensure you are getting an adequate amount of calcium (1,200 mg) and vitamin D (800 IU) daily. Pt verbalized understanding no further questions or concerns.     Elizabeth Noriega, CMA